# Patient Record
Sex: FEMALE | Race: WHITE | NOT HISPANIC OR LATINO
[De-identification: names, ages, dates, MRNs, and addresses within clinical notes are randomized per-mention and may not be internally consistent; named-entity substitution may affect disease eponyms.]

---

## 2022-10-12 PROBLEM — Z00.00 ENCOUNTER FOR PREVENTIVE HEALTH EXAMINATION: Status: ACTIVE | Noted: 2022-10-12

## 2022-10-14 ENCOUNTER — APPOINTMENT (OUTPATIENT)
Dept: PULMONOLOGY | Facility: CLINIC | Age: 77
End: 2022-10-14

## 2022-10-14 VITALS
HEIGHT: 63 IN | TEMPERATURE: 97.6 F | BODY MASS INDEX: 24.8 KG/M2 | WEIGHT: 140 LBS | OXYGEN SATURATION: 97 % | SYSTOLIC BLOOD PRESSURE: 115 MMHG | HEART RATE: 78 BPM | DIASTOLIC BLOOD PRESSURE: 75 MMHG

## 2022-10-14 DIAGNOSIS — R91.8 OTHER NONSPECIFIC ABNORMAL FINDING OF LUNG FIELD: ICD-10-CM

## 2022-10-14 PROCEDURE — 99203 OFFICE O/P NEW LOW 30 MIN: CPT

## 2022-10-16 PROBLEM — R91.8 PULMONARY NODULES: Status: ACTIVE | Noted: 2022-10-16

## 2022-10-16 NOTE — DISCUSSION/SUMMARY
[FreeTextEntry1] : likely  benign nodule\par \par sat down with patient and carefully reviewed all the findings on her ct\par \par fu in six months

## 2022-10-16 NOTE — HISTORY OF PRESENT ILLNESS
[TextBox_4] : modest smoker in the past\par \par went for screening for coronary\par \par healhty \par \par highcholesterol atorvastatin\par \par synthroid\par \par two escalopram  bupropin\par \par sulfa allergy\par \par otherwise is well.

## 2022-10-16 NOTE — PROCEDURE
[FreeTextEntry1] : spirometry could not be done\par \par ct reviewed only one nodule of note in the left upper lobe\par \par pet negative likely benign\par but needs fu

## 2022-12-15 ENCOUNTER — APPOINTMENT (OUTPATIENT)
Dept: ORTHOPEDIC SURGERY | Facility: CLINIC | Age: 77
End: 2022-12-15
Payer: MEDICARE

## 2022-12-15 VITALS — BODY MASS INDEX: 24.8 KG/M2 | HEIGHT: 63 IN | WEIGHT: 140 LBS

## 2022-12-15 PROCEDURE — 72110 X-RAY EXAM L-2 SPINE 4/>VWS: CPT

## 2022-12-15 PROCEDURE — 72070 X-RAY EXAM THORAC SPINE 2VWS: CPT

## 2022-12-15 PROCEDURE — 99204 OFFICE O/P NEW MOD 45 MIN: CPT

## 2022-12-31 ENCOUNTER — TRANSCRIPTION ENCOUNTER (OUTPATIENT)
Age: 77
End: 2022-12-31

## 2023-01-18 ENCOUNTER — APPOINTMENT (OUTPATIENT)
Dept: ORTHOPEDIC SURGERY | Facility: CLINIC | Age: 78
End: 2023-01-18
Payer: MEDICARE

## 2023-01-18 PROCEDURE — 72070 X-RAY EXAM THORAC SPINE 2VWS: CPT

## 2023-01-18 PROCEDURE — 72110 X-RAY EXAM L-2 SPINE 4/>VWS: CPT

## 2023-01-18 PROCEDURE — 99214 OFFICE O/P EST MOD 30 MIN: CPT

## 2023-02-04 NOTE — ASSESSMENT
[FreeTextEntry1] : 77 year old female presents with acute exacerbation of chronic back pain.  She denies injury.  She denies radicular pain.  She is neurologically intact.  We reviewed her imaging including her radiographs and MRI.  She has a T12 compression fracture.  We discussed treatment options including physical therapy, medications, spinal injections and surgery.  She will be sent for new lumbar and thoracic MRIs.  She will avoid lifting > 10 lbs, twisting or bending.  She will followup after her MRIs.  She can take meloxicam as needed.  We discussed red flag symptoms that would require emergent evaluation. She knows to call with any questions or concerns or if her symptoms acutely worsen.

## 2023-02-04 NOTE — PHYSICAL EXAM
[de-identified] : General: No acute distress, conversant, well-nourished.\par Head: Normocephalic, atraumatic\par Neck: trachea midline, FROM\par Heart: normotensive and normal rate and rhythm\par Lungs: No labored breathing\par Skin: No abrasions, no rashes, no edema\par Psych: Alert and oriented to person, place and time\par Extremities: no peripheral edema or digital cyanosis\par Gait: Normal gait. Can perform tandem gait.  \par Vascular: warm and well perfused distally, palpable distal pulses\par \par MSK:\par Spine:\par No tenderness to palpation.  No step-off, no deformity.\par \par NEURO EXAM:\par Sensation \par Left L2  -  2/2            \par Left L3  -  2/2\par Left L4  -  2/2\par Left L5  -  2/2\par Left S1  -  2/2\par \par Right L2  -  2/2            \par Right L3  -  2/2\par Right L4  -  2/2\par Right L5  -  2/2\par Right S1  -  2/2\par \par Motor: \par Left L2 (hip flexion)                            5/5                \par Left L3 (knee extension)                   5/5                \par Left L4 (ankle dorsiflexion)                 5/5                \par Left L5 (long toe extensor)                5/5                \par Left S1 (ankle plantar flexion)           5/5\par \par Right L2 (hip flexion)                            5/5                \par Right L3 (knee extension)                   5/5                \par Right L4 (ankle dorsiflexion)                 5/5                \par Right L5 (long toe extensor)                5/5                \par Right S1 (ankle plantar flexion)           5/5\par \par Reflexes: Normal and symmetric\par Negative clonus.  Down-going Babinski.   [de-identified] : I ordered radiographs to evaluate the patient's symptoms.\par \par Thoracic 2 view radiographs obtained in the office today shows T12 compression fracture.\par \par Lumbar 4 view radiographs taken in the office today show T12 compression fracture.  Lumbar spondylosis.  No instability on dynamic series.\par \par MRI Thoracic  (11/28/22) Severe compression fracture involving the T12 vertebral body with mild marrow edema suggesting a recent/subacute fracture, with associated severe posterior cortex retropulsion contributing to moderate spinal canal stenosis and mild cord impingement at T11-12, and mild to moderate spinal canal stenosis at T12-L1.\par Multilevel degenerative changes, as described. Levoscoliosis.\par \par MRI Lumbar  (11/28/22) Severe compression fracture involving the T12 vertebral body with mild marrow edema suggesting a recent/subacute fracture, with associated severe posterior cortex retropulsion contributing to moderate spinal canal stenosis and mild cord impingement at T11-12, and mild to moderate spinal canal stenosis at T12-L1.\par \par Multilevel degenerative changes contributing to moderate spinal canal stenosis at L4-5, mild at L2-3 and mild thecal sac impingement at other levels. Moderate to severe left and moderate right foraminal stenosis at L2-3, and mild to moderate at other levels as described. Severe lower lumbar facet arthrosis. Multilevel grade 1 spondylolistheses, as described.

## 2023-02-04 NOTE — HISTORY OF PRESENT ILLNESS
[de-identified] : 77 year old female presents with acute exacerbation of chronic back pain.  She denies injury.  She denies radicular pain, recent illness, fevers, numbness, weakness, balance problems, saddle anesthesia, urinary retention or fecal incontinence.  She has been doing PT without relief.   Activity makes it worse.  She has recent MRIs.

## 2023-02-07 ENCOUNTER — APPOINTMENT (OUTPATIENT)
Dept: ORTHOPEDIC SURGERY | Facility: CLINIC | Age: 78
End: 2023-02-07
Payer: MEDICARE

## 2023-02-07 PROCEDURE — 99214 OFFICE O/P EST MOD 30 MIN: CPT

## 2023-02-16 ENCOUNTER — APPOINTMENT (OUTPATIENT)
Dept: PHYSICAL MEDICINE AND REHAB | Facility: CLINIC | Age: 78
End: 2023-02-16
Payer: MEDICARE

## 2023-02-16 PROCEDURE — 99204 OFFICE O/P NEW MOD 45 MIN: CPT | Mod: 25

## 2023-02-16 PROCEDURE — 20611 DRAIN/INJ JOINT/BURSA W/US: CPT | Mod: RT

## 2023-02-16 RX ORDER — GABAPENTIN 300 MG/1
300 CAPSULE ORAL 3 TIMES DAILY
Qty: 90 | Refills: 0 | Status: DISCONTINUED | COMMUNITY
Start: 2023-02-07 | End: 2023-02-16

## 2023-02-16 NOTE — ASSESSMENT
[FreeTextEntry1] : MRI lumbar reviewed showing left foraminal L4/5 HNP and mod stenosis, T12 compression fracture.\par \par Discussed diagnosis and treatment plan including PT.\par Continue pilates but avoid back flexion.\par Consider LAUREN if not better.\par Will lower gabapentin since sedated in am.\par \par Call in 1 wk if she wants LAUREN.

## 2023-02-16 NOTE — PROCEDURE
[de-identified] : indication: pain\par \par Ultrasound Guided Right GT Injection\par \par After discussion of the risks and benefits, the patient has elected to proceed with a Greater Trochanteric Bursa Steroid and Anesthetic Injection. The skin was sterilized with Betadine, then alcohol. A needle was inserted down to the GT using ultrasound guidance.  Then a steroid solution consisting of 20 mg of Kenalog and 2 ml of 1% Lidocaine was instilled. \par \par She tolerated the procedure well and was discharged in good condition. \par \par \par Triamcinolone\par NDC [address]-9\par Exp 6/24\par Lot [id number]\par Manufacture [employer]\par \par \par Lidocaine\par Hospira\par [id number]\par Lot [id number]\par Exp 5/1/23\par \par

## 2023-02-16 NOTE — PHYSICAL EXAM
[FreeTextEntry1] : BAYLEE is a 77 year old female \par Constitutional: healthy appearing, NAD, and normal body habitus\par \par LUMBAR\par ROM: flexion to 30 deg, ext to 5 deg\par \par Gait: normal\par \par Inspection: no erythema, warmth\par Spine: no TTP in spinous process\par Bony palpation:  TTP in GT on right\par \par Soft tissue palpation hip: no TTP in gluteus kadi\par Soft tissue palpation of spine: no TTP in lumbar paraspinals\par \par 5/5 bilateral KE, DF, PF \par sensation intact in bilat LE\par \par Special tests: neg seated SLR\par \par

## 2023-02-18 NOTE — ASSESSMENT
[FreeTextEntry1] : 77 year old female followup with acute exacerbation of chronic back pain.  She has a T12 compression fracture.  She denies radicular pain. She is neurologically intact. She continues to have pain after her last appointment. We reviewed her new thoracic and lumbar MRIs. No significant change from previous imaging.  The patient was given a referral for consideration for spinal injections. She can continue meloxicam.  The patient was given a referral for physical therapy.  She will followup in 6-8 weeks. We discussed red flag symptoms that would require emergent evaluation. She knows to call with any questions or concerns or if her symptoms acutely worsen.

## 2023-02-18 NOTE — PHYSICAL EXAM
[de-identified] : I ordered radiographs to evaluate the patient's symptoms.\par \par Thoracic 2 view radiographs obtained in the office today shows T12 compression fracture. No change from previous films\par \par Lumbar 4 view radiographs taken in the office today show T12 compression fracture.  Lumbar spondylosis.  No instability on dynamic series.  No change from previous films\par \par MRI Thoracic  (11/28/22) Severe compression fracture involving the T12 vertebral body with mild marrow edema suggesting a recent/subacute fracture, with associated severe posterior cortex retropulsion contributing to moderate spinal canal stenosis and mild cord impingement at T11-12, and mild to moderate spinal canal stenosis at T12-L1.\par Multilevel degenerative changes, as described. Levoscoliosis.\par \par MRI Lumbar  (11/28/22) Severe compression fracture involving the T12 vertebral body with mild marrow edema suggesting a recent/subacute fracture, with associated severe posterior cortex retropulsion contributing to moderate spinal canal stenosis and mild cord impingement at T11-12, and mild to moderate spinal canal stenosis at T12-L1.\par \par Multilevel degenerative changes contributing to moderate spinal canal stenosis at L4-5, mild at L2-3 and mild thecal sac impingement at other levels. Moderate to severe left and moderate right foraminal stenosis at L2-3, and mild to moderate at other levels as described. Severe lower lumbar facet arthrosis. Multilevel grade 1 spondylolistheses, as described. [de-identified] : General: No acute distress, conversant, well-nourished.\par Head: Normocephalic, atraumatic\par Neck: trachea midline, FROM\par Heart: normotensive and normal rate and rhythm\par Lungs: No labored breathing\par Skin: No abrasions, no rashes, no edema\par Psych: Alert and oriented to person, place and time\par Extremities: no peripheral edema or digital cyanosis\par Gait: Normal gait. Can perform tandem gait.  \par Vascular: warm and well perfused distally, palpable distal pulses\par \par MSK:\par Spine:\par No tenderness to palpation.  No step-off, no deformity.\par \par NEURO EXAM:\par Sensation \par Left L2  -  2/2            \par Left L3  -  2/2\par Left L4  -  2/2\par Left L5  -  2/2\par Left S1  -  2/2\par \par Right L2  -  2/2            \par Right L3  -  2/2\par Right L4  -  2/2\par Right L5  -  2/2\par Right S1  -  2/2\par \par Motor: \par Left L2 (hip flexion)                            5/5                \par Left L3 (knee extension)                   5/5                \par Left L4 (ankle dorsiflexion)                 5/5                \par Left L5 (long toe extensor)                5/5                \par Left S1 (ankle plantar flexion)           5/5\par \par Right L2 (hip flexion)                            5/5                \par Right L3 (knee extension)                   5/5                \par Right L4 (ankle dorsiflexion)                 5/5                \par Right L5 (long toe extensor)                5/5                \par Right S1 (ankle plantar flexion)           5/5\par \par Reflexes: Normal and symmetric\par Negative clonus.  Down-going Babinski.

## 2023-02-18 NOTE — ASSESSMENT
[FreeTextEntry1] : 77 year old female followup with acute exacerbation of chronic back pain.  She has a T12 compression fracture.  She denies radicular pain. She is neurologically intact. Her pain has been improving since her last visit. She will be sent for new thoracic and lumbar MRIs to evaluate healing of fracture and continued pain. She was given an endocrine referral for her osteoporosis. She can take meloxicam. She will followup after her MRIs.. We discussed red flag symptoms that would require emergent evaluation. She knows to call with any questions or concerns or if her symptoms acutely worsen.

## 2023-02-18 NOTE — PHYSICAL EXAM
[de-identified] : General: No acute distress, conversant, well-nourished.\par Head: Normocephalic, atraumatic\par Neck: trachea midline, FROM\par Heart: normotensive and normal rate and rhythm\par Lungs: No labored breathing\par Skin: No abrasions, no rashes, no edema\par Psych: Alert and oriented to person, place and time\par Extremities: no peripheral edema or digital cyanosis\par Gait: Normal gait. Can perform tandem gait.  \par Vascular: warm and well perfused distally, palpable distal pulses\par \par MSK:\par Spine:\par No tenderness to palpation.  No step-off, no deformity.\par \par NEURO EXAM:\par Sensation \par Left L2  -  2/2            \par Left L3  -  2/2\par Left L4  -  2/2\par Left L5  -  2/2\par Left S1  -  2/2\par \par Right L2  -  2/2            \par Right L3  -  2/2\par Right L4  -  2/2\par Right L5  -  2/2\par Right S1  -  2/2\par \par Motor: \par Left L2 (hip flexion)                            5/5                \par Left L3 (knee extension)                   5/5                \par Left L4 (ankle dorsiflexion)                 5/5                \par Left L5 (long toe extensor)                5/5                \par Left S1 (ankle plantar flexion)           5/5\par \par Right L2 (hip flexion)                            5/5                \par Right L3 (knee extension)                   5/5                \par Right L4 (ankle dorsiflexion)                 5/5                \par Right L5 (long toe extensor)                5/5                \par Right S1 (ankle plantar flexion)           5/5\par \par Reflexes: Normal and symmetric\par Negative clonus.  Down-going Babinski.   [de-identified] : MRI of the lumbar spine demonstrates:(1/30/23)\par Redemonstration of severe T12 compression deformity contributing to effacement of the ventral thecal sac, not substantially changed in appearance compared to 11/28/2022.\par Redemonstration of dextroscoliosis and multilevel degenerative changes, not substantially changed.\par \par  MRI of the thoracic spine demonstrates:(1/26/23)\par Redemonstration of severe T12 compression deformity with bone marrow signal abnormality and posterior cortex retropulsion, not substantially changed compared to 11/28/2022.\par \par Thoracic 2 view radiographs (1/18/23) T12 compression fracture. No change from previous films\par \par Lumbar 4 view radiographs (1/18/23) T12 compression fracture.  Lumbar spondylosis.  No instability on dynamic series.  No change from previous films\par \par MRI Thoracic  (11/28/22) Severe compression fracture involving the T12 vertebral body with mild marrow edema suggesting a recent/subacute fracture, with associated severe posterior cortex retropulsion contributing to moderate spinal canal stenosis and mild cord impingement at T11-12, and mild to moderate spinal canal stenosis at T12-L1.\par Multilevel degenerative changes, as described. Levoscoliosis.\par \par MRI Lumbar  (11/28/22) Severe compression fracture involving the T12 vertebral body with mild marrow edema suggesting a recent/subacute fracture, with associated severe posterior cortex retropulsion contributing to moderate spinal canal stenosis and mild cord impingement at T11-12, and mild to moderate spinal canal stenosis at T12-L1.\par \par Multilevel degenerative changes contributing to moderate spinal canal stenosis at L4-5, mild at L2-3 and mild thecal sac impingement at other levels. Moderate to severe left and moderate right foraminal stenosis at L2-3, and mild to moderate at other levels as described. Severe lower lumbar facet arthrosis. Multilevel grade 1 spondylolistheses, as described.

## 2023-02-18 NOTE — HISTORY OF PRESENT ILLNESS
[de-identified] : 77 year old female followup with acute exacerbation of chronic back pain.  She has a T12 compression fracture.  She denies radicular pain, recent illness, fevers, numbness, weakness, balance problems, saddle anesthesia, urinary retention or fecal incontinence. She continues to have pain after her last visit. She is here to review her MRIs.

## 2023-02-18 NOTE — HISTORY OF PRESENT ILLNESS
[de-identified] : 77 year old female followup with acute exacerbation of chronic back pain.  She has a T12 compression fracture.  She denies radicular pain, recent illness, fevers, numbness, weakness, balance problems, saddle anesthesia, urinary retention or fecal incontinence. Her pain has been improving since her last visit.

## 2023-03-16 ENCOUNTER — RX RENEWAL (OUTPATIENT)
Age: 78
End: 2023-03-16

## 2023-03-16 RX ORDER — MELOXICAM 15 MG/1
15 TABLET ORAL
Qty: 30 | Refills: 0 | Status: ACTIVE | COMMUNITY
Start: 2023-01-18 | End: 1900-01-01

## 2023-03-17 ENCOUNTER — APPOINTMENT (OUTPATIENT)
Dept: PHYSICAL MEDICINE AND REHAB | Facility: CLINIC | Age: 78
End: 2023-03-17
Payer: MEDICARE

## 2023-03-17 DIAGNOSIS — M76.891 OTHER SPECIFIED ENTHESOPATHIES OF RIGHT LOWER LIMB, EXCLUDING FOOT: ICD-10-CM

## 2023-03-17 PROCEDURE — 99213 OFFICE O/P EST LOW 20 MIN: CPT

## 2023-03-17 NOTE — ASSESSMENT
[FreeTextEntry1] : MRI lumbar showing left foraminal L4/5 HNP and mod stenosis, T12 compression fracture.\par \par Discussed diagnosis and treatment plan including PT.\par Continue pilates but avoid back flexion.\par She has tried conservative tx including PT, nsaids for 3 months without relief. Discussed LAUREN in detail.  Risks include bleeding.  Stop nsaids 2 days before.\par \par She will continue a comprehensive rehabilitation program afterward, as this is important to prevent recurrence of pain.\par Taught hip abd.

## 2023-03-17 NOTE — PHYSICAL EXAM
[FreeTextEntry1] : BAYLEE is a 77 year old female \par Constitutional: healthy appearing, NAD, and normal body habitus\par \par LUMBAR\par ROM: flexion to 30 deg, ext to 5 deg\par \par Gait: normal\par \par Inspection: no erythema, warmth\par Spine: no TTP in spinous process\par Bony palpation: TTP in GT on right\par \par Soft tissue palpation hip: no TTP in gluteus kadi\par Soft tissue palpation of spine: no TTP in lumbar paraspinals\par \par 5/5 bilateral KE, DF, PF \par sensation intact in bilat LE\par

## 2023-03-17 NOTE — HISTORY OF PRESENT ILLNESS
[FreeTextEntry1] : Location: back and hips\par Severity: 6/10 \par Duration: few months\par Aggravating Factors: walking\par Alleviating Factors: not much\par Associated Symptoms: denies weight loss, fever, chills, change in bowel/bladder habits, weakness, numbness/tingling, +radiation down legs\par Prior Studies: MRI \par \par 2/2023 right GT injection - significant relief

## 2023-03-21 ENCOUNTER — APPOINTMENT (OUTPATIENT)
Dept: ORTHOPEDIC SURGERY | Facility: CLINIC | Age: 78
End: 2023-03-21
Payer: MEDICARE

## 2023-03-21 DIAGNOSIS — M43.16 SPONDYLOLISTHESIS, LUMBAR REGION: ICD-10-CM

## 2023-03-21 DIAGNOSIS — S22.080A WEDGE COMPRESSION FRACTURE OF T11-T12 VERTEBRA, INITIAL ENCOUNTER FOR CLOSED FRACTURE: ICD-10-CM

## 2023-03-21 PROCEDURE — 99214 OFFICE O/P EST MOD 30 MIN: CPT

## 2023-03-21 RX ORDER — GABAPENTIN 300 MG/1
300 CAPSULE ORAL 3 TIMES DAILY
Qty: 90 | Refills: 1 | Status: ACTIVE | COMMUNITY
Start: 2023-03-21 | End: 1900-01-01

## 2023-03-21 RX ORDER — MELOXICAM 15 MG/1
15 TABLET ORAL
Qty: 30 | Refills: 0 | Status: ACTIVE | COMMUNITY
Start: 2023-03-21 | End: 1900-01-01

## 2023-03-21 NOTE — ASSESSMENT
[FreeTextEntry1] : 77 year old female followup with acute exacerbation of chronic back pain.  She has T12 compression fracture.    She is neurologically intact. Since her last visit the back pain has continued. She has pain radiating to her right thigh. She will proceed with a lumbar epidural from Dr. Gonzalez.  She can continue gabapentin.  She will consider returning to PT after her move to [place]. She will followup in 4-6 weeks. We discussed red flag symptoms that would require emergent evaluation. She knows to call with any questions or concerns or if her symptoms acutely worsen.

## 2023-03-21 NOTE — PHYSICAL EXAM
[de-identified] : General: No acute distress, conversant, well-nourished.\par Head: Normocephalic, atraumatic\par Neck: trachea midline, FROM\par Heart: normotensive and normal rate and rhythm\par Lungs: No labored breathing\par Skin: No abrasions, no rashes, no edema\par Psych: Alert and oriented to person, place and time\par Extremities: no peripheral edema or digital cyanosis\par Gait: Normal gait. Can perform tandem gait.  \par Vascular: warm and well perfused distally, palpable distal pulses\par \par MSK:\par Spine:\par No tenderness to palpation.  No step-off, no deformity.\par \par NEURO EXAM:\par Sensation \par Left L2  -  2/2            \par Left L3  -  2/2\par Left L4  -  2/2\par Left L5  -  2/2\par Left S1  -  2/2\par \par Right L2  -  2/2            \par Right L3  -  2/2\par Right L4  -  2/2\par Right L5  -  2/2\par Right S1  -  2/2\par \par Motor: \par Left L2 (hip flexion)                            5/5                \par Left L3 (knee extension)                   5/5                \par Left L4 (ankle dorsiflexion)                 5/5                \par Left L5 (long toe extensor)                5/5                \par Left S1 (ankle plantar flexion)           5/5\par \par Right L2 (hip flexion)                            5/5                \par Right L3 (knee extension)                   5/5                \par Right L4 (ankle dorsiflexion)                 5/5                \par Right L5 (long toe extensor)                5/5                \par Right S1 (ankle plantar flexion)           5/5\par \par Reflexes: Normal and symmetric\par Negative clonus.  Down-going Babinski.   [de-identified] : MRI of the lumbar spine demonstrates:(1/30/23)\par Redemonstration of severe T12 compression deformity contributing to effacement of the ventral thecal sac, not substantially changed in appearance compared to 11/28/2022.\par Redemonstration of dextroscoliosis and multilevel degenerative changes, not substantially changed.\par \par  MRI of the thoracic spine demonstrates:(1/26/23)\par Redemonstration of severe T12 compression deformity with bone marrow signal abnormality and posterior cortex retropulsion, not substantially changed compared to 11/28/2022.\par \par Thoracic 2 view radiographs (1/18/23) T12 compression fracture. No change from previous films\par \par Lumbar 4 view radiographs (1/18/23) T12 compression fracture.  Lumbar spondylosis.  No instability on dynamic series.  No change from previous films\par \par MRI Thoracic  (11/28/22) Severe compression fracture involving the T12 vertebral body with mild marrow edema suggesting a recent/subacute fracture, with associated severe posterior cortex retropulsion contributing to moderate spinal canal stenosis and mild cord impingement at T11-12, and mild to moderate spinal canal stenosis at T12-L1.\par Multilevel degenerative changes, as described. Levoscoliosis.\par \par MRI Lumbar  (11/28/22) Severe compression fracture involving the T12 vertebral body with mild marrow edema suggesting a recent/subacute fracture, with associated severe posterior cortex retropulsion contributing to moderate spinal canal stenosis and mild cord impingement at T11-12, and mild to moderate spinal canal stenosis at T12-L1.\par \par Multilevel degenerative changes contributing to moderate spinal canal stenosis at L4-5, mild at L2-3 and mild thecal sac impingement at other levels. Moderate to severe left and moderate right foraminal stenosis at L2-3, and mild to moderate at other levels as described. Severe lower lumbar facet arthrosis. Multilevel grade 1 spondylolistheses, as described.

## 2023-03-21 NOTE — HISTORY OF PRESENT ILLNESS
[de-identified] : 77 year old female followup with acute exacerbation of chronic back pain.  She has a T12 compression fracture.  She denies recent illness, fevers, numbness, weakness, balance problems, saddle anesthesia, urinary retention or fecal incontinence. Since her last visit the back pain has continued. She has pain radiating to her right thigh.  She previously had a GT injection which helped.  She is awaiting a lumbar epidural from Dr. Gonzalez.

## 2023-04-03 ENCOUNTER — APPOINTMENT (OUTPATIENT)
Dept: PHYSICAL MEDICINE AND REHAB | Facility: CLINIC | Age: 78
End: 2023-04-03
Payer: MEDICARE

## 2023-04-03 DIAGNOSIS — M51.26 OTHER INTERVERTEBRAL DISC DISPLACEMENT, LUMBAR REGION: ICD-10-CM

## 2023-04-03 PROCEDURE — 64483 NJX AA&/STRD TFRM EPI L/S 1: CPT | Mod: RT

## 2023-04-03 PROCEDURE — 64484 NJX AA&/STRD TFRM EPI L/S EA: CPT | Mod: RT

## 2023-04-03 RX ORDER — LEVOTHYROXINE SODIUM 0.15 MG/1
150 TABLET ORAL
Qty: 90 | Refills: 0 | Status: ACTIVE | COMMUNITY
Start: 2022-10-05

## 2023-04-03 RX ORDER — ATORVASTATIN CALCIUM 20 MG/1
20 TABLET, FILM COATED ORAL
Qty: 90 | Refills: 0 | Status: ACTIVE | COMMUNITY
Start: 2022-10-05

## 2023-04-03 RX ORDER — TRIAMCINOLONE ACETONIDE 1 MG/G
0.1 CREAM TOPICAL
Qty: 30 | Refills: 0 | Status: ACTIVE | COMMUNITY
Start: 2023-01-09

## 2023-04-03 RX ORDER — BUPROPION HYDROCHLORIDE 150 MG/1
150 TABLET, EXTENDED RELEASE ORAL
Qty: 90 | Refills: 0 | Status: ACTIVE | COMMUNITY
Start: 2022-10-05

## 2023-04-03 RX ORDER — TEMAZEPAM 15 MG/1
15 CAPSULE ORAL
Qty: 30 | Refills: 0 | Status: ACTIVE | COMMUNITY
Start: 2023-03-02

## 2023-04-03 RX ORDER — ESCITALOPRAM OXALATE 20 MG/1
20 TABLET ORAL
Qty: 90 | Refills: 0 | Status: ACTIVE | COMMUNITY
Start: 2022-10-05

## 2023-04-03 RX ORDER — VALACYCLOVIR 500 MG/1
500 TABLET, FILM COATED ORAL
Qty: 30 | Refills: 0 | Status: ACTIVE | COMMUNITY
Start: 2022-04-13

## 2023-04-03 NOTE — ASSESSMENT
[FreeTextEntry1] : L5/S1 NF was tight.  Consider caudal selam.\par \par She  denies being sick or having f/c, SOB, cough.  She  understands risk of immunosuppression from steroids.  Do not get covid vaccine for 2 wk.\par \par f/u 2 wk

## 2023-04-03 NOTE — PROCEDURE
[de-identified] : indication: pain\par \par Fluoroscopically Guided, Contrast Enhanced, Right L5 and S1 Epidural Steroid Injection\par \par After informed consent, She was placed prone on the fluoroscopy table. Skin over the area was prepped and draped in the usual sterile fashion before being anesthetized with 1% Lidocaine. Then using an oblique approach, a 22 gauge 5 in spinal needle was directed down to the L5/S1 NF.   Needle placement was confirmed with AP fluoroscopic images. After negative aspiration for blood or cerebrospinal fluid, contrast was instilled under live fluoroscopy and an epidurogram was obtained. It showed good epidural flow without any vascular uptake. Then a steroid solution consisting of 20 mg of Kenalog, 1 ml of 0.5% Lidocaine was instilled.\par \par Then using a 22 gauge 3.5 in spinal needle was directed down to the S1 foramen.   Needle placement was confirmed with lateral fluoroscopic images. After negative aspiration for blood or cerebrospinal fluid, contrast was instilled under live fluoroscopy and an epidurogram was obtained. It showed good epidural flow without any vascular uptake. Then a steroid solution consisting of 20 mg of Kenalog, 1.5 ml of 0.5% Lidocaine was instilled. \par \par  She  was discharged in good condition.  Instructions given:  No soaking or bathing for 2 days but can take a shower.  No strenuous exercise for 4 days. \par \par \par \par Manufacture GE\par Omnipaque 240\par NDC [id number]\par Exp 5/4/24\par [id number]\par \par Triamcinolone\par NDC [address]-2\par Exp 6/24\par Lot [id number]\par Manufacture Amne[employer]\par \par \par Lidocaine\par Hospira\par [id number]\par Lot [id number]\par Exp 5/1/23\par \par

## 2023-04-06 ENCOUNTER — RX RENEWAL (OUTPATIENT)
Age: 78
End: 2023-04-06

## 2023-04-06 RX ORDER — GABAPENTIN 100 MG/1
100 CAPSULE ORAL
Qty: 60 | Refills: 0 | Status: ACTIVE | COMMUNITY
Start: 2023-02-16 | End: 1900-01-01

## 2023-04-27 ENCOUNTER — APPOINTMENT (OUTPATIENT)
Dept: PHYSICAL MEDICINE AND REHAB | Facility: CLINIC | Age: 78
End: 2023-04-27
Payer: MEDICARE

## 2023-04-27 DIAGNOSIS — M48.062 SPINAL STENOSIS, LUMBAR REGION WITH NEUROGENIC CLAUDICATION: ICD-10-CM

## 2023-04-27 DIAGNOSIS — M47.816 SPONDYLOSIS W/OUT MYELOPATHY OR RADICULOPATHY, LUMBAR REGION: ICD-10-CM

## 2023-04-27 DIAGNOSIS — M79.18 MYALGIA, OTHER SITE: ICD-10-CM

## 2023-04-27 PROCEDURE — 99213 OFFICE O/P EST LOW 20 MIN: CPT | Mod: 25

## 2023-04-27 PROCEDURE — 20552 NJX 1/MLT TRIGGER POINT 1/2: CPT

## 2023-04-28 RX ORDER — MELOXICAM 15 MG/1
15 TABLET ORAL DAILY
Qty: 30 | Refills: 0 | Status: ACTIVE | COMMUNITY
Start: 2023-04-28 | End: 1900-01-01

## 2023-05-01 PROBLEM — M47.816 LUMBAR SPONDYLOSIS: Status: ACTIVE | Noted: 2023-05-01

## 2023-05-01 NOTE — HISTORY OF PRESENT ILLNESS
[FreeTextEntry1] : Location: back and hips\par Severity: 4/10 \par Duration: few months\par Aggravating Factors: walking\par Alleviating Factors: selam\par Associated Symptoms: denies weight loss, fever, chills, change in bowel/bladder habits, weakness, numbness/tingling, radiation down legs\par Prior Studies: MRI \par \par 2/2023 right GT injection - significant relief \par 4/2023 right L5 and S1 SELAM - over 60% relief; no more leg pain

## 2023-05-01 NOTE — PHYSICAL EXAM
[FreeTextEntry1] : BAYLEE is a 77 year old female \par Constitutional: healthy appearing, NAD, and normal body habitus\par \par LUMBAR\par ROM: flexion to 30 deg, ext to 5 deg\par \par Gait: normal\par \par Inspection: no erythema, warmth\par Spine: no TTP in spinous process\par Bony palpation: TTP in GT on right\par \par Soft tissue palpation hip:  TTP in left gluteus kadi\par Soft tissue palpation of spine: TTP in left L2 paraspinals\par \par 5/5 bilateral KE, DF, PF \par sensation intact in bilat LE\par

## 2023-05-01 NOTE — ASSESSMENT
Conjuntivae and eyelids appear normal, Sclerae : White without injection [FreeTextEntry1] : MRI lumbar showing left foraminal L4/5 HNP and mod stenosis, T12 compression fracture.\par \par Discussed diagnosis and treatment plan including PT.\par Continue pilates but avoid back flexion.\par do more hip abd and bridges\par keep feet apart when walking to prevent tripping\par \par \par Discussed facet injections.  She will think about it.\par \par f/u 3 wk

## 2023-05-01 NOTE — PROCEDURE
[de-identified] : Indication: myofascial pain\par \par After informed consent,she elected to proceed with a trigger point injection into the left L2 paraspinals, gluteus kadi. I confirmed no prior adverse reactions, no active infections, and no relevant allergies. \par  \par The skin was prepped in the usual sterile manner.  The sites were injected with Lidocaine followed by local needling. The injection was completed without complication and a bandage was applied. She tolerated the procedure well and was given post-injection instructions. \par  \par Cold Tx x 48 hours, analgesics prn. Medications: 0.5 ml of 1% Lidocaine per site\par \par Exp 1/2024\par Manufacture: [employer] kabi\par NDC 55470-[address]-67\par LOT [id number]\par \par

## 2023-05-23 DIAGNOSIS — M54.6 PAIN IN THORACIC SPINE: ICD-10-CM

## 2023-05-23 DIAGNOSIS — M54.9 DORSALGIA, UNSPECIFIED: ICD-10-CM

## 2023-05-23 DIAGNOSIS — M54.50 LOW BACK PAIN, UNSPECIFIED: ICD-10-CM

## 2023-06-27 NOTE — HISTORY OF PRESENT ILLNESS
In home health care notified the office today stating that the patient fell hurting her right knee, right hip, right ankle.  She reports that she fell due to dizziness.  The patient denies any loss of consciousness or head trauma.  She is on heparin prophylaxis.     I have ordered a CBC, CMP, x-ray of her right hip, right ankle, right knee for her to complete.  Patient aware.  
[FreeTextEntry1] : Location: back and hips\par Severity: 5/10 \par Duration: few months\par Aggravating Factors: walking\par Alleviating Factors: not much\par Associated Symptoms: denies weight loss, fever, chills, change in bowel/bladder habits, weakness, numbness/tingling, +radiation down legs\par Prior Studies: MRI